# Patient Record
Sex: MALE | URBAN - METROPOLITAN AREA
[De-identification: names, ages, dates, MRNs, and addresses within clinical notes are randomized per-mention and may not be internally consistent; named-entity substitution may affect disease eponyms.]

---

## 2019-08-02 ENCOUNTER — TELEPHONE (OUTPATIENT)
Dept: FAMILY MEDICINE | Facility: CLINIC | Age: 34
End: 2019-08-02

## 2019-08-02 NOTE — TELEPHONE ENCOUNTER
Reason for Call: Request for an order or referral: [place] and RN called from [facility]     Order or referral being requested: needs lab orders olanzapine monitoring, A1C fasting lippids and CBS with differential     Date needed: as soon as possible    Has the patient been seen by the PCP for this problem? YES    Additional comments: please fax to [phone] orders have to written     Phone number Patient can be reached at:  Other phone number:  [phone]    Best Time:  any    Can we leave a detailed message on this number?  YES    Call taken on 8/2/2019 at 1:22 PM by Deb Peck

## 2019-08-06 NOTE — TELEPHONE ENCOUNTER
Somehow this message was marked as read by someone and missed 4 days ago, and I am just seeing it now. Per chart review, patient has not been seen here before and chart is empty, this is first encounter. Returned call to number below and got a VM stating it is RN line at [facility]. I left a VM that patient will need to make an appointment and establish with a provider to have labs ordered.    Caty Conway RN